# Patient Record
Sex: MALE | Race: WHITE | URBAN - METROPOLITAN AREA
[De-identification: names, ages, dates, MRNs, and addresses within clinical notes are randomized per-mention and may not be internally consistent; named-entity substitution may affect disease eponyms.]

---

## 2017-12-06 ENCOUNTER — APPOINTMENT (RX ONLY)
Dept: URBAN - METROPOLITAN AREA CLINIC 161 | Facility: CLINIC | Age: 37
Setting detail: DERMATOLOGY
End: 2017-12-06

## 2017-12-06 DIAGNOSIS — A63.0 ANOGENITAL (VENEREAL) WARTS: ICD-10-CM

## 2017-12-06 PROCEDURE — ? ORDER TESTS

## 2017-12-06 PROCEDURE — ? LIQUID NITROGEN

## 2017-12-06 PROCEDURE — 17110 DESTRUCTION B9 LES UP TO 14: CPT

## 2017-12-06 PROCEDURE — ? COUNSELING

## 2017-12-06 ASSESSMENT — TOTAL NUMBER OF CONDYLOMA: # OF LESIONS?: 2

## 2017-12-06 ASSESSMENT — LOCATION ZONE DERM: LOCATION ZONE: TRUNK

## 2017-12-06 ASSESSMENT — LOCATION DETAILED DESCRIPTION DERM: LOCATION DETAILED: SUPRAPUBIC SKIN

## 2017-12-06 ASSESSMENT — LOCATION SIMPLE DESCRIPTION DERM: LOCATION SIMPLE: GROIN

## 2017-12-06 NOTE — PROCEDURE: LIQUID NITROGEN
Add 52 Modifier (Optional): no
Medical Necessity Information: It is in your best interest to select a reason for this procedure from the list below. All of these items fulfill various CMS LCD requirements except the new and changing color options.
Consent: The patient's consent was obtained including but not limited to risks of crusting, scabbing, blistering, scarring, darker or lighter pigmentary change, recurrence, incomplete removal and infection.
Detail Level: Simple
Medical Necessity Clause: This procedure was medically necessary because the lesions that were treated were: constantly rubbing on collar of patients shirt.
Post-Care Instructions: I reviewed with the patient in detail post-care instructions. Patient is to wear sunprotection, and avoid picking at any of the treated lesions. Pt may apply Vaseline to crusted or scabbing areas.